# Patient Record
Sex: FEMALE | Race: ASIAN | NOT HISPANIC OR LATINO
[De-identification: names, ages, dates, MRNs, and addresses within clinical notes are randomized per-mention and may not be internally consistent; named-entity substitution may affect disease eponyms.]

---

## 2018-10-22 PROBLEM — Z00.00 ENCOUNTER FOR PREVENTIVE HEALTH EXAMINATION: Status: ACTIVE | Noted: 2018-10-22

## 2018-10-26 ENCOUNTER — OTHER (OUTPATIENT)
Age: 58
End: 2018-10-26

## 2018-10-29 ENCOUNTER — APPOINTMENT (OUTPATIENT)
Dept: BREAST CENTER | Facility: CLINIC | Age: 58
End: 2018-10-29
Payer: MEDICAID

## 2018-10-29 VITALS
WEIGHT: 130 LBS | BODY MASS INDEX: 21.66 KG/M2 | SYSTOLIC BLOOD PRESSURE: 111 MMHG | HEART RATE: 62 BPM | HEIGHT: 65 IN | OXYGEN SATURATION: 99 % | DIASTOLIC BLOOD PRESSURE: 74 MMHG | TEMPERATURE: 97.7 F

## 2018-10-29 PROCEDURE — 99204 OFFICE O/P NEW MOD 45 MIN: CPT

## 2019-04-22 ENCOUNTER — APPOINTMENT (OUTPATIENT)
Dept: BREAST CENTER | Facility: CLINIC | Age: 59
End: 2019-04-22
Payer: MEDICAID

## 2019-04-22 VITALS
TEMPERATURE: 98 F | HEART RATE: 67 BPM | WEIGHT: 130 LBS | HEIGHT: 65 IN | BODY MASS INDEX: 21.66 KG/M2 | DIASTOLIC BLOOD PRESSURE: 75 MMHG | SYSTOLIC BLOOD PRESSURE: 127 MMHG

## 2019-04-22 DIAGNOSIS — Z80.3 FAMILY HISTORY OF MALIGNANT NEOPLASM OF BREAST: ICD-10-CM

## 2019-04-22 DIAGNOSIS — Z85.3 PERSONAL HISTORY OF MALIGNANT NEOPLASM OF BREAST: ICD-10-CM

## 2019-04-22 PROCEDURE — 99214 OFFICE O/P EST MOD 30 MIN: CPT

## 2019-04-22 NOTE — HISTORY OF PRESENT ILLNESS
[FreeTextEntry1] : 59 y/o female former [facility] pt her for f/u.  S/p Right mastectomy 6/22/17 ER/UT neg, Pis3zrq positive, DCIS with microinvasion, N5vrwM6/4, 9/6/17 left prophylactic mastectomy, had implant reconstructions but both removed due to rupture of the right one.  Was thought to be too thin for DIXIE.  Saw Dr. Juanjo Patel, no Rx, does not f/u with him.  No breast complaints.\par FHx significant for breast cancer sister 52 (reports her genetic testing negative). Declines genetic testing.

## 2019-04-22 NOTE — ASSESSMENT
[FreeTextEntry1] : 59 y/o female former [facility] pt her for f/u.  S/p Right mastectomy 6/22/17 ER/ID neg, Hac7dnq positive, DCIS with microinvasion, B1zyeX6/4, 9/6/17 left prophylactic mastectomy, had implant reconstructions but both removed due to rupture of the right one.  Was thought to be too thin for DIXIE.  Saw Dr. Juanjo Patel, no Rx.  No breast complaints.\par FHx significant for breast cancer sister 52 (reports her genetic testing negative). Declines genetic testing.\par CBE: Bilat mastectomies, SIXTO. No axillary or SC lymphadenopathy. Full ROM and no lymphedema. \par Declines reconstruction. Pt doing well, denies breast issues.  Does not need RX for prosthesis or bra today.

## 2019-04-22 NOTE — PHYSICAL EXAM
[Normocephalic] : normocephalic [Atraumatic] : atraumatic [Supple] : supple [No Supraclavicular Adenopathy] : no supraclavicular adenopathy [No Thyromegaly] : no thyromegaly [Examined in the supine and seated position] : examined in the supine and seated position [No Axillary Lymphadenopathy] : no left axillary lymphadenopathy [No Edema] : no edema [No Rashes] : no rashes [No Ulceration] : no ulceration [No Swelling] : no swelling [Full ROM] : full range of motion [de-identified] : Bilat mastectomies

## 2019-04-22 NOTE — PHYSICAL EXAM
[Normocephalic] : normocephalic [Atraumatic] : atraumatic [Supple] : supple [No Supraclavicular Adenopathy] : no supraclavicular adenopathy [No Thyromegaly] : no thyromegaly [Examined in the supine and seated position] : examined in the supine and seated position [No Axillary Lymphadenopathy] : no left axillary lymphadenopathy [No Edema] : no edema [No Rashes] : no rashes [No Ulceration] : no ulceration [No Swelling] : no swelling [Full ROM] : full range of motion [de-identified] : Bilat mastectomies

## 2019-04-22 NOTE — ASSESSMENT
[FreeTextEntry1] : 57 y/o female former [facility] pt her for f/u.  S/p Right mastectomy 6/22/17 ER/FL neg, Mza1viz positive, DCIS with microinvasion, P2etoI7/4, 9/6/17 left prophylactic mastectomy, had implant reconstructions but both removed due to rupture of the right one.  Was thought to be too thin for DIXIE.  Saw Dr. Juanjo Patel, no Rx.  No breast complaints.\par FHx significant for breast cancer sister 52 (reports her genetic testing negative). Declines genetic testing.\par CBE: Bilat mastectomies, SIXTO. No axillary or SC lymphadenopathy. Full ROM and no lymphedema. \par Declines reconstruction. Pt doing well, denies breast issues.  Does not need RX for prosthesis or bra today.

## 2019-04-22 NOTE — HISTORY OF PRESENT ILLNESS
[FreeTextEntry1] : 59 y/o female former [facility] pt her for f/u.  S/p Right mastectomy 6/22/17 ER/AR neg, Qbw6hop positive, DCIS with microinvasion, V0wpzC5/4, 9/6/17 left prophylactic mastectomy, had implant reconstructions but both removed due to rupture of the right one.  Was thought to be too thin for DIXIE.  Saw Dr. Juanjo Patel, no Rx, does not f/u with him.  No breast complaints.\par FHx significant for breast cancer sister 52 (reports her genetic testing negative). Declines genetic testing.

## 2019-04-22 NOTE — PAST MEDICAL HISTORY
[FreeTextEntry5] : C/S [Postmenopausal] : The patient is postmenopausal [Menarche Age ____] : age at menarche was [unfilled] [Menopause Age____] : age at menopause was [unfilled] [Total Preg ___] : G[unfilled] [Living ___] : Living: [unfilled] [Age At Live Birth ___] : Age at live birth: [unfilled]

## 2019-10-20 NOTE — PAST MEDICAL HISTORY
[Postmenopausal] : The patient is postmenopausal [Menarche Age ____] : age at menarche was [unfilled] [Menopause Age____] : age at menopause was [unfilled] [Total Preg ___] : G[unfilled] [Living ___] : Living: [unfilled] [Age At Live Birth ___] : Age at live birth: [unfilled]

## 2019-10-21 ENCOUNTER — APPOINTMENT (OUTPATIENT)
Dept: BREAST CENTER | Facility: CLINIC | Age: 59
End: 2019-10-21
Payer: MEDICAID

## 2019-10-21 VITALS
HEIGHT: 65 IN | DIASTOLIC BLOOD PRESSURE: 70 MMHG | HEART RATE: 64 BPM | BODY MASS INDEX: 21.66 KG/M2 | TEMPERATURE: 98.8 F | WEIGHT: 130 LBS | SYSTOLIC BLOOD PRESSURE: 114 MMHG

## 2019-10-21 PROCEDURE — 99214 OFFICE O/P EST MOD 30 MIN: CPT

## 2019-10-21 NOTE — ASSESSMENT
[FreeTextEntry1] : 60 y/o female former [facility] pt her for f/u.  S/p Right mastectomy 6/22/17 ER/IA neg, Kyi1qhx positive, DCIS with microinvasion, O1dswI0/4, 9/6/17 left prophylactic mastectomy, had implant reconstructions but both removed due to rupture of the right one.  Was thought to be too thin for DIXIE.  Saw Dr. Juanjo Patel, no Rx, does not f/u with him.  No breast complaints.\par Pt denies any breast lesions, discharge or masses.\par Occasionally feels discomfort on right arm with  too much activity but dissipates without Rx. No lymphedema. \par FHx significant for breast cancer sister 52 (reports her genetic testing negative). Declines genetic testing.\par CBE: Bilat mastectomies, Full ROM, no LE. SIXTO\par PT wondering about screening u/s, discussed no indication for screening but would do diagnostic if indicated. \par Also wondering about resuming screenings with PCP, usually follow for 1 year then may resume if no issues.

## 2019-10-21 NOTE — PHYSICAL EXAM
[Normocephalic] : normocephalic [Atraumatic] : atraumatic [No Supraclavicular Adenopathy] : no supraclavicular adenopathy [Supple] : supple [Examined in the supine and seated position] : examined in the supine and seated position [No Thyromegaly] : no thyromegaly [No Axillary Lymphadenopathy] : no left axillary lymphadenopathy [No Edema] : no edema [No Swelling] : no swelling [Full ROM] : full range of motion [No Rashes] : no rashes [No Ulceration] : no ulceration [de-identified] : Bilat mastectomies

## 2019-10-21 NOTE — HISTORY OF PRESENT ILLNESS
[FreeTextEntry1] : 58 y/o female former [facility] pt her for f/u.  S/p Right mastectomy 6/22/17 ER/LA neg, Gnq2unc positive, DCIS with microinvasion, Y9mabK6/4, 9/6/17 left prophylactic mastectomy, had implant reconstructions but both removed due to rupture of the right one.  Was thought to be too thin for DIXIE.  Saw Dr. Juanjo Patel, no Rx, does not f/u with him.  No breast complaints.\par Pt denies any breast lesions, discharge or masses.\par Occasionally feels discomfort on right arm with  too much activity but dissipates without Rx. No lymphedema. \par FHx significant for breast cancer sister 52 (reports her genetic testing negative). Declines genetic testing.

## 2020-10-27 ENCOUNTER — APPOINTMENT (OUTPATIENT)
Dept: BREAST CENTER | Facility: CLINIC | Age: 60
End: 2020-10-27

## 2020-11-05 ENCOUNTER — APPOINTMENT (OUTPATIENT)
Dept: BREAST CENTER | Facility: CLINIC | Age: 60
End: 2020-11-05
Payer: MEDICAID

## 2020-11-05 VITALS
WEIGHT: 130 LBS | HEART RATE: 74 BPM | DIASTOLIC BLOOD PRESSURE: 77 MMHG | TEMPERATURE: 97.5 F | BODY MASS INDEX: 21.66 KG/M2 | SYSTOLIC BLOOD PRESSURE: 119 MMHG | HEIGHT: 65 IN

## 2020-11-05 DIAGNOSIS — N63.10 UNSPECIFIED LUMP IN THE RIGHT BREAST, UNSPECIFIED QUADRANT: ICD-10-CM

## 2020-11-05 PROCEDURE — 99214 OFFICE O/P EST MOD 30 MIN: CPT

## 2020-11-05 PROCEDURE — 99072 ADDL SUPL MATRL&STAF TM PHE: CPT

## 2020-11-05 NOTE — PHYSICAL EXAM
[Normocephalic] : normocephalic [Atraumatic] : atraumatic [Supple] : supple [No Supraclavicular Adenopathy] : no supraclavicular adenopathy [No Thyromegaly] : no thyromegaly [Examined in the supine and seated position] : examined in the supine and seated position [No dominant masses] : no dominant masses in right breast  [No dominant masses] : no dominant masses left breast [No Nipple Retraction] : no left nipple retraction [No Nipple Discharge] : no left nipple discharge [No Axillary Lymphadenopathy] : no left axillary lymphadenopathy [No Edema] : no edema [No Swelling] : no swelling [Full ROM] : full range of motion [No Rashes] : no rashes [No Ulceration] : no ulceration [de-identified] : Bilat mastectomy scar.  Area referred to pt, ? scar vs fat necrosis, very small subtle 1 mm area above the inc, does not appear suspicious.

## 2020-11-05 NOTE — ASSESSMENT
[FreeTextEntry1] : 59 y/o female former [facility] pt here for f/u.  S/p Right mastectomy 6/22/17 ER/MA neg, Bze2rbv positive, DCIS with microinvasion, A5gpkE5/4, 9/6/17 left prophylactic mastectomy, had implant reconstructions but both removed due to rupture of the right one.  Was thought to be too thin for DIXIE. Pt is 3.5 years out.   Saw Dr. Juanjo Patel, no Rx, does not f/u with him.  \par FHx significant for breast cancer sister 52 (reports her genetic testing negative). Declines genetic testing.\par Pt c/o small lump above her right mastectomy scar x2 weeks. \par \par CBE: Bilat mastectomy, full ROM and no lymphedema.  "Lump" referred by pt, ? scarring, fat necrosis, only about 1  mm at the most.  No axillary or SC lymphadenopathy.\par Pt reassured but she is anxious, will get targeted u/s. Discussed if u/s neg, then f.u 3 mos.

## 2020-11-24 ENCOUNTER — NON-APPOINTMENT (OUTPATIENT)
Age: 60
End: 2020-11-24

## 2021-03-01 ENCOUNTER — APPOINTMENT (OUTPATIENT)
Dept: BREAST CENTER | Facility: CLINIC | Age: 61
End: 2021-03-01
Payer: MEDICAID

## 2021-03-01 VITALS
HEIGHT: 65 IN | DIASTOLIC BLOOD PRESSURE: 73 MMHG | HEART RATE: 69 BPM | SYSTOLIC BLOOD PRESSURE: 117 MMHG | BODY MASS INDEX: 21.66 KG/M2 | WEIGHT: 130 LBS | TEMPERATURE: 97.3 F

## 2021-03-01 PROCEDURE — 99072 ADDL SUPL MATRL&STAF TM PHE: CPT

## 2021-03-01 PROCEDURE — 99214 OFFICE O/P EST MOD 30 MIN: CPT

## 2021-03-01 NOTE — DATA REVIEWED
[FreeTextEntry1] : 11/24/20 [facility], R US: 11/12:00 superficial ill-defined nodule 0.3 cm, BR4, U/S bx recommended - patient declined recommendation and waited to observe. F/U 3 mos rec\par \par

## 2021-03-01 NOTE — ASSESSMENT
[FreeTextEntry1] : 61 y/o female former [facility] pt here for f/u.  Pt was rec for biopsy but declined 11/20. \par \par S/p Right mastectomy 6/22/17 ER/NM neg, Wor1jck positive, DCIS with microinvasion, Y7sjqK4/4, 9/6/17 left prophylactic mastectomy, had implant reconstructions but both removed due to rupture of the right one. Was thought to be too thin for DIXIE. Pt is 3.5 years out. Saw Dr. Juanjo Patel, no Rx, does not f/u with him. \par \par 11/24/20 [facility], R US: 11/12:00 superficial ill-defined nodule 0.3 cm, BR4, U/S bx recommended - patient declined recommendation and waited to observe. F/U 3 mos rec\par \par FHx significant for breast cancer sister 52 (reports her genetic testing negative). Declines genetic testing.\par Pt thinks lump is unchanged above right mastectomy scar. \par CBE: Bilat mastectomy, full ROM and no lymphedema. "Lump" referred by pt appears less obvious, and ? if palpable. \par Reviewed u/s, discussed since declines biopsy rec is for f.u u/s.  Discussed plan with Pt's , Dr. Bunn. \par

## 2021-03-01 NOTE — PAST MEDICAL HISTORY
[Postmenopausal] : The patient is postmenopausal [Menarche Age ____] : age at menarche was [unfilled] [Menopause Age____] : age at menopause was [unfilled] [Total Preg ___] : G[unfilled] [Living ___] : Living: [unfilled]

## 2021-03-01 NOTE — HISTORY OF PRESENT ILLNESS
[FreeTextEntry1] : 59 y/o female former [facility] pt here for f/u.  Pt was rec for biopsy but declined 11/20. \par \par S/p Right mastectomy 6/22/17 ER/OR neg, Mdr3one positive, DCIS with microinvasion, A7zvnG7/4, 9/6/17 left prophylactic mastectomy, had implant reconstructions but both removed due to rupture of the right one. Was thought to be too thin for DIXIE. Pt is 3.5 years out. Saw Dr. Juanjo Patel, no Rx, does not f/u with him. \par \par 11/24/20 [facility], R US: 11/12:00 superficial ill-defined nodule 0.3 cm, BR4, U/S bx recommended - patient declined recommendation and waited to observe. F/U 3 mos rec\par \par FHx significant for breast cancer sister 52 (reports her genetic testing negative). Declines genetic testing.\par Pt thinks lump is unchanged above right mastectomy scar. \par \par Denies lymphedema and has full ROM. \par

## 2021-03-01 NOTE — PHYSICAL EXAM
[Normocephalic] : normocephalic [Atraumatic] : atraumatic [Supple] : supple [No Supraclavicular Adenopathy] : no supraclavicular adenopathy [Examined in the supine and seated position] : examined in the supine and seated position [No dominant masses] : no dominant masses in right breast  [No dominant masses] : no dominant masses left breast [No Nipple Retraction] : no left nipple retraction [No Nipple Discharge] : no left nipple discharge [No Axillary Lymphadenopathy] : no left axillary lymphadenopathy [No Edema] : no edema [No Swelling] : no swelling [Full ROM] : full range of motion [No Rashes] : no rashes [No Ulceration] : no ulceration [de-identified] : Bilat mastectomy.  [de-identified] : Prior area of concern, 1 mm area appears less obvious, ? if palpable.

## 2021-07-23 ENCOUNTER — APPOINTMENT (OUTPATIENT)
Dept: UROLOGY | Facility: CLINIC | Age: 61
End: 2021-07-23
Payer: MEDICAID

## 2021-07-23 VITALS
HEART RATE: 66 BPM | RESPIRATION RATE: 16 BRPM | BODY MASS INDEX: 23.37 KG/M2 | OXYGEN SATURATION: 98 % | HEIGHT: 62 IN | WEIGHT: 127 LBS | DIASTOLIC BLOOD PRESSURE: 71 MMHG | SYSTOLIC BLOOD PRESSURE: 122 MMHG

## 2021-07-23 PROCEDURE — 99204 OFFICE O/P NEW MOD 45 MIN: CPT

## 2021-07-26 LAB
APPEARANCE: CLEAR
BACTERIA UR CULT: NORMAL
BACTERIA: NEGATIVE
BILIRUBIN URINE: NEGATIVE
BLOOD URINE: NEGATIVE
COLOR: COLORLESS
GLUCOSE QUALITATIVE U: NEGATIVE
HYALINE CASTS: 0 /LPF
KETONES URINE: NEGATIVE
LEUKOCYTE ESTERASE URINE: NEGATIVE
MICROSCOPIC-UA: NORMAL
NITRITE URINE: NEGATIVE
PH URINE: 7
PROTEIN URINE: NEGATIVE
RED BLOOD CELLS URINE: 0 /HPF
SPECIFIC GRAVITY URINE: 1
SQUAMOUS EPITHELIAL CELLS: 0 /HPF
UROBILINOGEN URINE: NORMAL
WHITE BLOOD CELLS URINE: 0 /HPF

## 2021-08-01 LAB — URINE CYTOLOGY: NORMAL

## 2021-08-01 NOTE — HISTORY OF PRESENT ILLNESS
[FreeTextEntry1] : 60 yo Pashto speaking F presents with history of intermittent gross hematuria\par Occurred about 2 months ago and lasted about 4 days\par No dysuria but some incomplete bladder emptying sensation\par no fever\par Last year had issues with recurrent UTI while living in [place]\par Per pt, was on some sort of med or 6 months for UTI prevention\par Most recent UTI was in 2020 - usually gets dysuria and pelvic pain with UTI\par Drinks 2L of water\par Voids 5-6 times per day, no nocturia\par mild stress incontinence\par normal bowel movements\par 1 child, \par LMP = 7 yrs ago\par not sexually active\par CT urogram ordered by PCP on 21 at [facility] - mild right hydro on delayed imaging with no filling defects

## 2021-08-01 NOTE — ASSESSMENT
[FreeTextEntry1] : 60 yo F with hematuria\par \par - Reviewed CT imaging through [facility] portal and confirmed findings as stated above\par - Discussed possible etiologies for hematuria including benign (UTI, nephrolithiasis, cyst, BPH) vs malignancy (renal, ureteral and bladder). Discussed hematuria workup which includes upper tract imaging such as US or CT urogram and cystoscopy. Discussed risk and benefits of cystoscopy.\par - UA, culture, cytology\par - Schedule cysto in the near future.

## 2021-08-11 ENCOUNTER — APPOINTMENT (OUTPATIENT)
Age: 61
End: 2021-08-11
Payer: MEDICAID

## 2021-08-11 VITALS
RESPIRATION RATE: 16 BRPM | OXYGEN SATURATION: 99 % | SYSTOLIC BLOOD PRESSURE: 148 MMHG | DIASTOLIC BLOOD PRESSURE: 88 MMHG | HEART RATE: 77 BPM

## 2021-08-11 DIAGNOSIS — R31.9 HEMATURIA, UNSPECIFIED: ICD-10-CM

## 2021-08-11 PROCEDURE — 52000 CYSTOURETHROSCOPY: CPT

## 2021-08-15 PROBLEM — R31.9 HEMATURIA: Status: ACTIVE | Noted: 2021-07-23

## 2021-08-18 ENCOUNTER — APPOINTMENT (OUTPATIENT)
Dept: BREAST CENTER | Facility: CLINIC | Age: 61
End: 2021-08-18
Payer: MEDICAID

## 2021-08-18 DIAGNOSIS — R92.8 OTHER ABNORMAL AND INCONCLUSIVE FINDINGS ON DIAGNOSTIC IMAGING OF BREAST: ICD-10-CM

## 2021-08-18 DIAGNOSIS — Z78.9 OTHER SPECIFIED HEALTH STATUS: ICD-10-CM

## 2021-08-18 DIAGNOSIS — D05.11 INTRADUCTAL CARCINOMA IN SITU OF RIGHT BREAST: ICD-10-CM

## 2021-08-18 PROCEDURE — 99214 OFFICE O/P EST MOD 30 MIN: CPT | Mod: 95

## 2021-08-18 RX ORDER — UBIDECARENONE/VIT E ACET 100MG-5
CAPSULE ORAL
Refills: 0 | Status: ACTIVE | COMMUNITY

## 2021-08-18 NOTE — REASON FOR VISIT
[Home] : at home, [unfilled] , at the time of the visit. [Other Location: e.g. Home (Enter Location, City,State)___] : at [unfilled] [Verbal consent obtained from patient] : the patient, [unfilled] [Follow-Up: _____] : a [unfilled] follow-up visit [Spouse] : spouse

## 2021-08-20 PROBLEM — D05.11 DUCTAL CARCINOMA IN SITU (DCIS) OF RIGHT BREAST: Status: ACTIVE | Noted: 2018-10-29

## 2021-08-20 PROBLEM — R92.8 ABNORMAL ULTRASOUND OF BREAST: Status: ACTIVE | Noted: 2020-11-24

## 2021-08-20 RX ORDER — HYDROCORTISONE 2.5% 25 MG/G
2.5 CREAM TOPICAL
Qty: 30 | Refills: 0 | Status: ACTIVE | COMMUNITY
Start: 2021-08-12

## 2021-08-20 RX ORDER — ATORVASTATIN CALCIUM 10 MG/1
10 TABLET, FILM COATED ORAL
Qty: 30 | Refills: 0 | Status: ACTIVE | COMMUNITY
Start: 2021-08-12

## 2021-08-20 RX ORDER — MAGNESIUM OXIDE 400 MG
400 (241.3 MG) TABLET ORAL
Qty: 30 | Refills: 0 | Status: ACTIVE | COMMUNITY
Start: 2021-08-12

## 2021-08-20 RX ORDER — GLYCERIN, HYPROMELLOSE, POLYETHYLENE GLYCOL .2; .2; 1 G/100ML; G/100ML; G/100ML
0.2-0.2-1 LIQUID OPHTHALMIC
Qty: 15 | Refills: 0 | Status: ACTIVE | COMMUNITY
Start: 2021-08-12

## 2021-08-20 RX ORDER — VITAMIN B COMPLEX
CAPSULE ORAL
Qty: 30 | Refills: 0 | Status: ACTIVE | COMMUNITY
Start: 2021-08-12

## 2021-08-20 RX ORDER — ESOMEPRAZOLE MAGNESIUM 40 MG/1
40 CAPSULE, DELAYED RELEASE ORAL
Qty: 30 | Refills: 0 | Status: ACTIVE | COMMUNITY
Start: 2021-08-12

## 2021-08-20 NOTE — DATA REVIEWED
[FreeTextEntry1] : 5/10/21, [facility], R U/S: 11-12:00 just superior to surgical scar 2mm superficial hypoechoic lesion, stable to decreased from prior, stability suggests benign etiology, rec repeat targeted R U/S 6 mos. to reassess finding and confirm benign impression, BR3\par

## 2021-08-20 NOTE — ASSESSMENT
[FreeTextEntry1] : 62 y/o Japanese female former [facility] pt here via [facility] for f/u after Right u/s. Pt's , also present.  Pt was rec for biopsy but declined 11/20. \par \par S/p Right mastectomy 6/22/17 ER/[place] neg, Mxi3dpb positive, DCIS with microinvasion, G2ajhB0/4, 9/6/17 left prophylactic mastectomy, had implant reconstructions but both removed due to rupture of the right one. Was thought to be too thin for DIXIE. Pt is 3.5 years out. Saw Dr. Juanjo Patel, no Rx, does not f/u with him. \par \par 11/24/20 [facility], R US: 11/12:00 superficial ill-defined nodule 0.3 cm, BR4, U/S bx recommended - patient declined recommendation and waited to observe. F/U 3 mos rec\par 5/10/21, [facility], R U/S: 11-12:00 just superior to surgical scar 2mm superficial hypoechoic lesion, stable to decreased from prior, stability suggests benign etiology, rec repeat targeted R U/S 6 mos. to reassess finding and confirm benign impression, BR3\par \par FHx significant for breast cancer sister 52 (reports her genetic testing negative). Declines genetic testing.\par Pt no longer feels the lump above right mastectomy scar. \par 3/21 CBE: Bilat mastectomy, full ROM and no lymphedema. "Lump" referred by pt appears less obvious, and ? if palpable. \par Deferred CBE due to TEB today.\par Reviewed u/s, discussed lesion is stable or decreased and rec is for f.u RIght u/s 11/21. If negative, F.u in 3/1/22 for annual exam. Pt does not see oncologist. \par

## 2021-08-20 NOTE — HISTORY OF PRESENT ILLNESS
[FreeTextEntry1] : 62 y/o Sinhala female former [facility] pt here via [facility] for f/u after Right u/s. Pt's , also present.  Pt was rec for biopsy but declined 11/20. \par \par S/p Right mastectomy 6/22/17 ER/[place] neg, Glo7sfm positive, DCIS with microinvasion, Y0ydwT5/4, 9/6/17 left prophylactic mastectomy, had implant reconstructions but both removed due to rupture of the right one. Was thought to be too thin for DIXIE. Pt is 3.5 years out. Saw Dr. Juanjo Patel, no Rx, does not f/u with him. \par \par 11/24/20 [facility], R US: 11/12:00 superficial ill-defined nodule 0.3 cm, BR4, U/S bx recommended - patient declined recommendation and waited to observe. F/U 3 mos rec\par 5/10/21, [facility], R U/S: 11-12:00 just superior to surgical scar 2mm superficial hypoechoic lesion, stable to decreased from prior, stability suggests benign etiology, rec repeat targeted R U/S 6 mos. to reassess finding and confirm benign impression, BR3\par \par FHx significant for breast cancer sister 52 (reports her genetic testing negative). Declines genetic testing.\par Pt no longer feels the lump above right mastectomy scar. \par Medical hx change includes just started on cholesterol meds, pt cannot remember name, has to  Rx. \par \par 3/21 CBE: Bilat mastectomy, full ROM and no lymphedema. "Lump" referred by pt appears less obvious, and ? if palpable. \par Reviewed u/s, discussed since declines biopsy rec is for f.u u/s. Discussed plan with Pt's , Dr. Bunn. \par

## 2024-10-07 ENCOUNTER — APPOINTMENT (OUTPATIENT)
Dept: CARDIOLOGY | Facility: CLINIC | Age: 64
End: 2024-10-07
Payer: MEDICAID

## 2024-10-07 VITALS
HEIGHT: 62 IN | RESPIRATION RATE: 16 BRPM | HEART RATE: 70 BPM | WEIGHT: 134 LBS | SYSTOLIC BLOOD PRESSURE: 112 MMHG | OXYGEN SATURATION: 97 % | DIASTOLIC BLOOD PRESSURE: 72 MMHG | BODY MASS INDEX: 24.66 KG/M2

## 2024-10-07 DIAGNOSIS — R00.2 PALPITATIONS: ICD-10-CM

## 2024-10-07 PROCEDURE — 99204 OFFICE O/P NEW MOD 45 MIN: CPT

## 2024-10-07 PROCEDURE — G2211 COMPLEX E/M VISIT ADD ON: CPT | Mod: NC

## 2024-10-17 DIAGNOSIS — R42 DIZZINESS AND GIDDINESS: ICD-10-CM

## 2024-11-11 ENCOUNTER — APPOINTMENT (OUTPATIENT)
Dept: CARDIOLOGY | Facility: CLINIC | Age: 64
End: 2024-11-11
Payer: MEDICAID

## 2024-11-11 VITALS — SYSTOLIC BLOOD PRESSURE: 104 MMHG | DIASTOLIC BLOOD PRESSURE: 68 MMHG

## 2024-11-11 DIAGNOSIS — I47.10 SUPRAVENTRICULAR TACHYCARDIA, UNSPECIFIED: ICD-10-CM

## 2024-11-11 PROCEDURE — 93880 EXTRACRANIAL BILAT STUDY: CPT

## 2024-11-11 PROCEDURE — G2211 COMPLEX E/M VISIT ADD ON: CPT | Mod: NC

## 2024-11-11 PROCEDURE — 93306 TTE W/DOPPLER COMPLETE: CPT

## 2024-11-11 PROCEDURE — 99214 OFFICE O/P EST MOD 30 MIN: CPT

## 2024-11-11 RX ORDER — METOPROLOL SUCCINATE 25 MG/1
25 TABLET, EXTENDED RELEASE ORAL DAILY
Qty: 90 | Refills: 3 | Status: ACTIVE | COMMUNITY
Start: 2024-11-11 | End: 1900-01-01

## 2025-02-03 ENCOUNTER — APPOINTMENT (OUTPATIENT)
Dept: CARDIOLOGY | Facility: CLINIC | Age: 65
End: 2025-02-03
Payer: MEDICAID

## 2025-02-03 VITALS
RESPIRATION RATE: 16 BRPM | OXYGEN SATURATION: 99 % | SYSTOLIC BLOOD PRESSURE: 110 MMHG | HEIGHT: 62 IN | DIASTOLIC BLOOD PRESSURE: 75 MMHG | WEIGHT: 132 LBS | BODY MASS INDEX: 24.29 KG/M2 | HEART RATE: 65 BPM

## 2025-02-03 PROCEDURE — G2211 COMPLEX E/M VISIT ADD ON: CPT | Mod: NC

## 2025-02-03 PROCEDURE — 99214 OFFICE O/P EST MOD 30 MIN: CPT
